# Patient Record
Sex: MALE | Race: WHITE | NOT HISPANIC OR LATINO | Employment: FULL TIME | URBAN - METROPOLITAN AREA
[De-identification: names, ages, dates, MRNs, and addresses within clinical notes are randomized per-mention and may not be internally consistent; named-entity substitution may affect disease eponyms.]

---

## 2022-08-03 ENCOUNTER — TELEPHONE (OUTPATIENT)
Dept: NEUROSURGERY | Facility: CLINIC | Age: 77
End: 2022-08-03
Payer: MEDICARE

## 2022-08-03 NOTE — TELEPHONE ENCOUNTER
Called patient back regarding appointment w Dr Nicholson because he heard of him through a friend. Informed patient that he should see Edilia first for a proper workup before he sees Dr Nicholson. Pt agreed to be seen on 8/10 at 11am. Pt will also bring insurance info and myelogram on disc to appt.

## 2022-08-09 ENCOUNTER — TELEPHONE (OUTPATIENT)
Dept: NEUROSURGERY | Facility: CLINIC | Age: 77
End: 2022-08-09
Payer: MEDICARE

## 2022-08-09 NOTE — TELEPHONE ENCOUNTER
----- Message from Jen Aguirre sent at 8/9/2022 12:34 PM CDT -----  GENESIS DUMAS calling regarding want to know if Edilia Parr still have 8/10 @ 3pm open.  He would like the appt if it's available call back [phone]

## 2022-08-09 NOTE — TELEPHONE ENCOUNTER
Spoke with pt regarding an appt time that he was offered on 8/10. Informed pt that Edilia is booked on 8/10-11. Offered pt appt for 10/12 at 11:30. Pt agreed to be seen.

## 2022-08-12 ENCOUNTER — OFFICE VISIT (OUTPATIENT)
Dept: NEUROSURGERY | Facility: CLINIC | Age: 77
End: 2022-08-12
Payer: MEDICARE

## 2022-08-12 ENCOUNTER — HOSPITAL ENCOUNTER (OUTPATIENT)
Dept: RADIOLOGY | Facility: HOSPITAL | Age: 77
Discharge: HOME | End: 2022-08-12
Attending: NURSE PRACTITIONER
Payer: MEDICARE

## 2022-08-12 VITALS — HEART RATE: 84 BPM | SYSTOLIC BLOOD PRESSURE: 135 MMHG | DIASTOLIC BLOOD PRESSURE: 77 MMHG

## 2022-08-12 DIAGNOSIS — M41.9 SCOLIOSIS, UNSPECIFIED SCOLIOSIS TYPE, UNSPECIFIED SPINAL REGION: ICD-10-CM

## 2022-08-12 DIAGNOSIS — M54.9 DORSALGIA, UNSPECIFIED: ICD-10-CM

## 2022-08-12 DIAGNOSIS — M47.812 CERVICAL SPONDYLOSIS: ICD-10-CM

## 2022-08-12 DIAGNOSIS — M54.9 DORSALGIA, UNSPECIFIED: Primary | ICD-10-CM

## 2022-08-12 DIAGNOSIS — M54.16 LUMBAR RADICULOPATHY: ICD-10-CM

## 2022-08-12 DIAGNOSIS — M54.12 CERVICAL RADICULOPATHY: ICD-10-CM

## 2022-08-12 PROCEDURE — 72082 X-RAY EXAM ENTIRE SPI 2/3 VW: CPT | Mod: 26,,, | Performed by: STUDENT IN AN ORGANIZED HEALTH CARE EDUCATION/TRAINING PROGRAM

## 2022-08-12 PROCEDURE — 72082 X-RAY EXAM ENTIRE SPI 2/3 VW: CPT | Mod: TC

## 2022-08-12 PROCEDURE — 99999 PR PBB SHADOW E&M-EST. PATIENT-LVL III: ICD-10-PCS | Mod: PBBFAC,,, | Performed by: NURSE PRACTITIONER

## 2022-08-12 PROCEDURE — 72082 XR SCOLIOSIS COMPLETE: ICD-10-PCS | Mod: 26,,, | Performed by: STUDENT IN AN ORGANIZED HEALTH CARE EDUCATION/TRAINING PROGRAM

## 2022-08-12 PROCEDURE — 99213 OFFICE O/P EST LOW 20 MIN: CPT | Mod: PBBFAC | Performed by: NURSE PRACTITIONER

## 2022-08-12 PROCEDURE — 99204 OFFICE O/P NEW MOD 45 MIN: CPT | Mod: S$PBB,,, | Performed by: NURSE PRACTITIONER

## 2022-08-12 PROCEDURE — 99204 PR OFFICE/OUTPT VISIT, NEW, LEVL IV, 45-59 MIN: ICD-10-PCS | Mod: S$PBB,,, | Performed by: NURSE PRACTITIONER

## 2022-08-12 PROCEDURE — 99999 PR PBB SHADOW E&M-EST. PATIENT-LVL III: CPT | Mod: PBBFAC,,, | Performed by: NURSE PRACTITIONER

## 2022-08-12 RX ORDER — ROSUVASTATIN CALCIUM 20 MG/1
20 TABLET, COATED ORAL DAILY
COMMUNITY

## 2022-08-12 RX ORDER — FUROSEMIDE 20 MG/1
20 TABLET ORAL
COMMUNITY
Start: 2022-06-29

## 2022-08-12 RX ORDER — OLMESARTAN MEDOXOMIL 40 MG/1
40 TABLET ORAL DAILY
COMMUNITY
Start: 2022-08-12

## 2022-08-12 RX ORDER — CYANOCOBALAMIN 1000 UG/ML
1000 INJECTION, SOLUTION INTRAMUSCULAR; SUBCUTANEOUS DAILY
COMMUNITY

## 2022-08-12 RX ORDER — HYDROCORTISONE 25 MG/G
2.5 CREAM TOPICAL
COMMUNITY
Start: 2022-07-26

## 2022-08-12 RX ORDER — MIRTAZAPINE 15 MG/1
15 TABLET, FILM COATED ORAL DAILY
COMMUNITY
Start: 2022-08-02

## 2022-08-12 RX ORDER — MUPIROCIN 20 MG/G
2 OINTMENT TOPICAL 3 TIMES DAILY
COMMUNITY
Start: 2022-08-11

## 2022-08-12 RX ORDER — METFORMIN HYDROCHLORIDE 750 MG/1
750 TABLET, EXTENDED RELEASE ORAL 2 TIMES DAILY
COMMUNITY

## 2022-08-12 RX ORDER — ICOSAPENT ETHYL 1000 MG/1
4 CAPSULE ORAL DAILY
COMMUNITY
Start: 2022-08-02

## 2022-08-12 RX ORDER — ZOLPIDEM TARTRATE 12.5 MG/1
12.5 TABLET, FILM COATED, EXTENDED RELEASE ORAL DAILY
COMMUNITY

## 2022-08-12 RX ORDER — POTASSIUM CHLORIDE 1500 MG/1
20 TABLET, EXTENDED RELEASE ORAL DAILY PRN
COMMUNITY
Start: 2022-06-29

## 2022-08-19 NOTE — PROGRESS NOTES
Neurosurgery  History & Physical    SUBJECTIVE:     Chief Complaint: Neck and Back Pain    History of Present Illness: Rolando Vanegas is a 76 y.o. male being seen in clinic today to discuss concerns with worsening neck and back pain. States that he has struggled with intermittent pain for several years; however, over the past 3 years the pain has progressed despite conservative therapy. Denies trauma. His neck pain is worse than the back pain. Describes the pain as constant and aching down the neck extending into the shoulders. Rates the pain as a 3/10. Aggravating factors include movement and lifting. Denies alleviating factors. Denies b/b dysfunction, saddle anesthesia, or gait instability. Endorses weakness in the SRINIVASAN with atrophy of the bicep. He has obtained PT with mild relief.    Review of patient's allergies indicates:   Allergen Reactions    Latex, natural rubber Rash    Sulfa (sulfonamide antibiotics) Rash       Current Outpatient Medications   Medication Sig Dispense Refill    apixaban (ELIQUIS) 5 mg Tab Take 5 mg by mouth 2 (two) times daily.      cyanocobalamin 1,000 mcg/mL injection Inject 1,000 mcg into the skin once daily.      furosemide (LASIX) 20 MG tablet Take 20 mg by mouth as needed.      hydrocortisone 2.5 % cream Apply 2.5 application topically as needed.      metFORMIN (GLUCOPHAGE-XR) 750 MG ER 24hr tablet Take 750 mg by mouth 2 (two) times daily.      mirtazapine (REMERON) 15 MG tablet Take 15 mg by mouth once daily.      mupirocin (BACTROBAN) 2 % ointment Apply 2 Tubes topically 3 (three) times daily.      olmesartan (BENICAR) 40 MG tablet Take 40 mg by mouth once daily at 6am.      potassium chloride (K-TAB) 20 mEq Take 20 mEq by mouth daily as needed.      rosuvastatin (CRESTOR) 20 MG tablet Take 20 mg by mouth once daily.      VASCEPA 1 gram Cap Take 4 g by mouth once daily at 6am.      zolpidem (AMBIEN CR) 12.5 MG CR tablet Take 12.5 mg by mouth once daily.       No  current facility-administered medications for this visit.       No past medical history on file.  No past surgical history on file.  Family History    None       Social History     Socioeconomic History    Marital status:        Review of Systems   Constitutional: Negative for activity change, appetite change and fever.   HENT: Negative for hearing loss and postnasal drip.    Eyes: Negative for pain and visual disturbance.   Respiratory: Negative for shortness of breath.    Cardiovascular: Negative for chest pain and palpitations.   Gastrointestinal: Negative for abdominal pain.   Endocrine: Negative for polydipsia, polyphagia and polyuria.   Musculoskeletal: Positive for arthralgias, back pain, myalgias, neck pain and neck stiffness. Negative for gait problem.   Neurological: Positive for weakness and numbness. Negative for dizziness and headaches.   Psychiatric/Behavioral: Negative for confusion and dysphoric mood.       OBJECTIVE:     Vital Signs  Pulse: 84  BP: 135/77  Pain Score:   3  There is no height or weight on file to calculate BMI.      Neurosurgery Physical Exam  General: well developed, well nourished, no distress.   Head: normocephalic, atraumatic  Neurologic: Alert and oriented. Thought content appropriate.  GCS: Motor: 6/Verbal: 5/Eyes: 4 GCS Total: 15  Mental Status: Awake, Alert, Oriented x 4  Language: No aphasia  Speech: No dysarthria  Cranial nerves: face symmetric, tongue midline, CN II-XII grossly intact.   Eyes: pupils equal, round, reactive to light with accomodation, EOMI.   Pulmonary: normal respirations, no signs of respiratory distress  Abdomen: soft, non-distended  Skin: Skin is warm, dry and intact.  Sensory: intact to light touch throughout  Motor Strength:Moves all extremities spontaneously with good tone.     Strength  Deltoids Triceps Biceps Wrist Extension Wrist Flexion Hand    Upper: R 5/5 5/5 5/5 5/5 5/5 5/5    L 4+/5 4+/5 5/5 5/5 5/5 5/5     HF KE KF DF PF EHL    Lower: R 5/5 5/5 5/5 5/5 5/5 5/5    L 5/5 5/5 5/5 5/5 5/5 5/5     Reflexes:   Collins's: Negative.  Clonus: Negative.     Cerebellar:   Gait stable, fluid.   Tandem Gait: No difficulty  Able to walk on heels & toes     Cervical:   ROM: Pain limited with flexion, extension, lateral rotation and ear-to-shoulder bend.   Midline TTP: Positive    Thoracic:  Midline TTP: Negative.     Lumbar:  Midline TTP: Negative.  Straight Leg Test: Negative.    Other:  SI joint TTP: Negative.  Greater trochanter TTP: Negative.  Tenderness with external/internal hip rotation: Negative.    Diagnostic Results:  I have personally reviewed the x-ray cervical spine flex/ex dated 8/4/22, CT cervical myelogram dated 5/2/22,     1. X-ray cervical spine shows no instability    2. CT cervical myelogram shows multilevel degenerative changes most pronounced C5/6 and C6/7 with central and neural foraminal narrowing.     ASSESSMENT/PLAN:   Rolando Vanegas is a 76 y.o. male seen in clinic today to discuss concerns with worsening neck and back pain. States that he has struggled with intermittent pain for several years; however, over the past 3 years the pain has progressed despite conservative therapy. A scoliosis series has been ordered. The patient would like to follow-up in clinic with Dr. Nicholson to discuss if surgery would be beneficial. I have encouraged him to contact the clinic with any questions, concerns, or adverse clinical changes. He verbalized understanding.     KARI Rogers  Neurosurgery  [facility].     Note dictated with voice recognition software, please excuse any grammatical errors.

## 2022-08-25 ENCOUNTER — PATIENT MESSAGE (OUTPATIENT)
Dept: NEUROSURGERY | Facility: CLINIC | Age: 77
End: 2022-08-25
Payer: MEDICARE

## 2022-08-30 ENCOUNTER — TELEPHONE (OUTPATIENT)
Dept: NEUROSURGERY | Facility: CLINIC | Age: 77
End: 2022-08-30
Payer: MEDICARE

## 2022-09-14 ENCOUNTER — TELEPHONE (OUTPATIENT)
Dept: NEUROSURGERY | Facility: CLINIC | Age: 77
End: 2022-09-14
Payer: MEDICARE

## 2022-10-06 ENCOUNTER — PATIENT MESSAGE (OUTPATIENT)
Dept: RESEARCH | Facility: HOSPITAL | Age: 77
End: 2022-10-06
Payer: MEDICARE

## 2022-10-20 ENCOUNTER — TELEPHONE (OUTPATIENT)
Dept: NEUROSURGERY | Facility: CLINIC | Age: 77
End: 2022-10-20
Payer: MEDICARE

## 2022-10-20 NOTE — TELEPHONE ENCOUNTER
"Spoke to pt via telephone and offered to reschedule appointment. Pt declined. Pt states "Physical Therapy seems to be working so I will see how this goes and reschedule later on". Advised pt I would place him on our wait list and that he can reach out to us to reschedule when ready. Also advised that Dr. Nicholson is booked out about 2 months in advance. Pt verbalized understanding.    ----- Message from Alexandria Kunz sent at 10/20/2022  9:09 AM CDT -----  Contact: pt  Pt requesting a callback he canceled his appt for today but he want to rescheduled the same one               Confirmed contact below:  Contact Name:Rolando Vanegas  Phone Number: [phone]     "